# Patient Record
Sex: MALE | Race: OTHER | NOT HISPANIC OR LATINO | URBAN - METROPOLITAN AREA
[De-identification: names, ages, dates, MRNs, and addresses within clinical notes are randomized per-mention and may not be internally consistent; named-entity substitution may affect disease eponyms.]

---
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
## 2021-09-16 NOTE — ED PROVIDER NOTE - NS_ACPWITHSCRIBE_ED_ALL_ED
"I personally performed the services described in the documentation  recorded by the scribe in my presence, and it accurately and completely records my words and action.”
wife/spouse

## 2023-08-29 NOTE — ED PEDIATRIC TRIAGE NOTE - MODE OF ARRIVAL
"Diagnostic Evaluation Consultation  Crisis Assessment    Patient Name: Edi Sorenson  Age:  46 year old  Legal Sex: male  Gender Identity: male  Pronouns:   Race: Black or   Ethnicity: Not  or   Language: English      Patient was assessed: In person      Patient location: [facility] EMERGENCY DEPARTMENT                             [facility]    Referral Data and Chief Complaint  Edi Sorenson presents to the ED via EMS. Patient is presenting to the ED for the following concerns: Health stressors, Substance use, Suicidal ideation, Suicide attempt, Depression, Anxiety (hallucinations).   Factors that make the mental health crisis life threatening or complex are:  Pt presents to ED for concerns of increased SI and withdrawal symptoms. Pt reports he last used crack cocaine and fentanyl 4 days ago, is now experiencing withdrawal symptoms. Denied offer for suboxone because he plans to detox \"cold turkey\". Does not appear to be experiencing dramatic withdrawal symptoms. Pt reports active SI and states he has been attempting to starve himself over the past few days. Continues to endorse active SI. Denies HI, NSSIB. Pt reports increasing auditory and visual hallucinations. Pt stopped taking his mental health and HIV medications \"awhile ago\"..      Informed Consent and Assessment Methods  Explained the crisis assessment process, including applicable information disclosures and limits to confidentiality, assessed understanding of the process, and obtained consent to proceed with the assessment.  Assessment methods included conducting a formal interview with patient, review of medical records, collaboration with medical staff, and obtaining relevant collateral information from family and community providers when available.  : done     Patient response to interventions: acceptance expressed  Coping skills were attempted to reduce the crisis:  None     History of the Crisis   Hx of " chronic substance abuse, depression, anxiety, schizophrenia. Denies having outpatient supports for mental health at this time. Previous  admissions, most recently 2022. No psychiatry, no therapy at  this time. Hx of HIV diagnosis but has not been taking his medications for this. Denies commitment history. Hx of suicide attempts via cutting himself (unknown when) and recently via starvation attempt via pt report.    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)  Barriers in Personal Life:       Significant Clinical History  Current Anxiety Symptoms:  anxious  Current Depression/Trauma:  thoughts of death/suicide, sadness, impaired decision making, difficulty concentrating  Current Somatic Symptoms:  somatic symptoms (abdominal pain, headache, tension)  Current Psychosis/Thought Disturbance:  auditory hallucinations, visual hallucinations  Current Eating Symptoms:   (Not eaten in 4 days per pt report)  Chemical Use History:  Alcohol: None  Benzodiazepines: None  Opiates: None (Fentanyl)  Last Use:: 08/25/23  Cocaine: Smoked (crack cocaine)  Last Use:: 08/25/23  Marijuana: None  Other Use: None  Withdrawal Symptoms: Nausea   Past diagnosis:  Schizophrenia, Suicide attempt(s), Substance Use Disorder, Depression  Family history:  No known history of mental health or chemical health concerns, Anxiety Disorder  Past treatment:  Inpatient Hospitalization, Case management, Other ([facility] tx)  Details of most recent treatment:  Most recent treatment was in patient 2022  Other relevant history:  n/a       Collateral Information  Is there collateral information:  (None obtained)        Risk Assessment  [place] Suicide Severity Rating Scale Full Clinical Version:  Suicidal Ideation  Q1 Wish to be  Dead (Lifetime): Yes  Q2 Non-Specific Active Suicidal Thoughts (Lifetime): Yes  3. Active Suicidal Ideation with any Methods (Not Plan) Without Intent to Act (Lifetime): Yes  Q4 Active Suicidal Ideation with Some Intent to Act, Without Specific Plan (Lifetime): Yes  Q5 Active Suicidal Ideation with Specific Plan and Intent (Lifetime): Yes  Q6 Suicide Behavior (Lifetime): yes     Suicidal Behavior (Lifetime)  Actual Attempt (Lifetime): Yes    [place] Suicide Severity Rating Scale Recent:   Suicidal Ideation (Recent)  Q1 Wished to be Dead (Past Month): yes  Q2 Suicidal Thoughts (Past Month): yes  Q3 Suicidal Thought Method: yes  Q4 Suicidal Intent without Specific Plan: no  Q5 Suicide Intent with Specific Plan: yes  Level of Risk per Screen: high risk          Environmental or Psychosocial Events: other life stressors, ongoing abuse of substances, worsening chronic illness  Protective Factors: Protective Factors: strong bond to family unit, community support, or employment, responsibilities and duties to others, including pets and children, help seeking    Does the patient have thoughts of harming others? Feels Like Hurting Others: no  Previous Attempt to Hurt Others: no  Is the patient engaging in sexually inappropriate behavior?: no    Is the patient engaging in sexually inappropriate behavior?  no        Mental Status Exam   Affect: Labile  Appearance: Disheveled  Attention Span/Concentration: Inattentive  Eye Contact: Avoidant    Fund of Knowledge: Delayed   Language /Speech Content: Non-Fluent  Language /Speech Volume: Soft  Language /Speech Rate/Productions: Minimally Responsive  Recent Memory: Variable  Remote Memory: Variable  Mood: Anxious, Sad, Depressed  Orientation to Person: Yes   Orientation to Place: Yes  Orientation to Time of Day: Yes  Orientation to Date: Yes     Situation (Do they understand why they are here?): Yes  Psychomotor Behavior: Underactive  Thought Content: Suicidal,  Hallucinations  Thought Form: Intact     Medication  Current Facility-Administered Medications   Medication    [START ON 8/29/2023] bictegravir-emtricitabine-tenofovir (BIKTARVY) -25 MG per tablet 1 tablet    mirtazapine (REMERON) tablet TABS 7.5 mg     Current Outpatient Medications   Medication    acetaminophen (TYLENOL) 325 MG tablet    alum & mag hydroxide-simethicone (MAALOX) 200-200-20 MG/5ML SUSP suspension    apixaban ANTICOAGULANT (ELIQUIS) 5 MG tablet    benzocaine-menthol (CEPACOL) 15-3.6 MG lozenge    bictegravir-emtricitabine-tenofovir (BIKTARVY) -25 MG per tablet    gabapentin (NEURONTIN) 100 MG capsule    guaiFENesin (ROBITUSSIN) 20 mg/mL SOLN solution    ibuprofen (ADVIL/MOTRIN) 600 MG tablet    loratadine (CLARITIN) 10 MG tablet    melatonin 3 MG tablet    mirtazapine (REMERON) 7.5 MG tablet    pantoprazole (PROTONIX) 40 MG EC tablet    polyethylene glycol (MIRALAX) 17 GM/Dose powder    risperiDONE (RISPERDAL) 1 MG tablet    senna-docusate (SENOKOT-S/PERICOLACE) 8.6-50 MG tablet    sertraline (ZOLOFT) 50 MG tablet     Facility-Administered Medications Ordered in Other Encounters   Medication    Self Administer Medications: Behavioral Services           Current Care Team  Patient Care Team:  Braydon Puentes MD as PCP - General    Diagnosis  Patient Active Problem List   Diagnosis Code    Depression F32.A    Altered mental status R41.82    Suicidal ideation R45.851    Acute low back pain M54.50    Constipation K59.00    Chemical dependency (H) F19.20    [place]li (H) F30.9    Anxiety F41.9    Other stimulant use, 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 Pt has been off of  "his mental health medications for \"a long time\" specifics unknown. Pt last used crack cocaine and meth approximately 4 days ago because he is trying to stop cold turkey. Pt felt he could no longer manage his psychiatric symptoms and concerns so he sought out the emergency department. Pt reports increased auditory and visual hallucinations. Pt reports active suicidal ideation, stating he has been trying to starve himselff but it didn't work. Pt has not eaten in several days. Pt appears visibly stressed, tearful, depressed, apathetic. Pt willing to come into hospital voluntarily to restart his medication regimen.       Imminent risk of harm: Suicidal Behavior  Severe psychiatric, behavioral or other comorbid conditions are appropriate for management at inpatient mental health as indicated by at least one of the following: Psychiatric Symptoms, Symptoms of impact to function, Comorbid substance use disorder  Severe dysfunction in daily living is present as indicated by at least one of the following: Other evidence of severe dysfunction  Situation and expectations are appropriate for inpatient care: Voluntary treatment at lower level of care is not feasible  Inpatient mental health services are necessary to meet patient needs and at least one of the following: Specific condition related to admission diagnosis is present and judged likely to further improve at proposed level of care      Patient coping skills attempted to reduce the crisis:  None    Disposition  Recommended disposition: Inpatient Mental Health, Medication Management        Reviewed case and recommendations with attending provider. Attending Name: Dr. Hernandez       Attending concurs with disposition: yes       Patient and/or validated legal guardian concurs with disposition:   yes       Final disposition:  inpatient mental health    Legal status on admission: Voluntary/Patient has signed consent for treatment    Assessment Details   Total duration spent on " the patient case in minutes: 30 min     CPT code(s) utilized: 17816 - Psychotherapy for Crisis - 60 (30-74*) min    SHANKAR Hurtado, Psychotherapist  [facility] - Triage & Transition Services  Callback: [phone]          Walk in